# Patient Record
Sex: MALE | Race: WHITE | Employment: FULL TIME | URBAN - METROPOLITAN AREA
[De-identification: names, ages, dates, MRNs, and addresses within clinical notes are randomized per-mention and may not be internally consistent; named-entity substitution may affect disease eponyms.]

---

## 2024-03-19 ENCOUNTER — OFFICE VISIT (OUTPATIENT)
Dept: PRIMARY CARE | Facility: CLINIC | Age: 64
End: 2024-03-19
Payer: COMMERCIAL

## 2024-03-19 VITALS
HEIGHT: 70 IN | TEMPERATURE: 98.2 F | SYSTOLIC BLOOD PRESSURE: 133 MMHG | HEART RATE: 57 BPM | DIASTOLIC BLOOD PRESSURE: 77 MMHG | BODY MASS INDEX: 25.62 KG/M2 | OXYGEN SATURATION: 97 % | WEIGHT: 179 LBS

## 2024-03-19 DIAGNOSIS — E78.2 HYPERLIPIDEMIA, MIXED: ICD-10-CM

## 2024-03-19 DIAGNOSIS — H57.9 EYE PRESSURE: ICD-10-CM

## 2024-03-19 DIAGNOSIS — Z23 NEED FOR SHINGLES VACCINE: ICD-10-CM

## 2024-03-19 DIAGNOSIS — Z12.5 SCREENING FOR PROSTATE CANCER: ICD-10-CM

## 2024-03-19 DIAGNOSIS — E55.9 VITAMIN D DEFICIENCY: ICD-10-CM

## 2024-03-19 DIAGNOSIS — Z00.00 PHYSICAL EXAM: Primary | ICD-10-CM

## 2024-03-19 DIAGNOSIS — Z23 NEED FOR TDAP VACCINATION: ICD-10-CM

## 2024-03-19 DIAGNOSIS — Z12.11 SCREENING FOR COLON CANCER: ICD-10-CM

## 2024-03-19 PROBLEM — M17.12 PATELLOFEMORAL ARTHRITIS OF LEFT KNEE: Status: ACTIVE | Noted: 2024-03-19

## 2024-03-19 PROBLEM — J06.9 ACUTE UPPER RESPIRATORY INFECTION: Status: ACTIVE | Noted: 2024-03-19

## 2024-03-19 PROBLEM — M23.304 DERANGEMENT OF MEDIAL MENISCUS OF LEFT KNEE: Status: ACTIVE | Noted: 2024-03-19

## 2024-03-19 PROBLEM — S83.232A COMPLEX TEAR OF MEDIAL MENISCUS OF LEFT KNEE AS CURRENT INJURY: Status: ACTIVE | Noted: 2024-03-19

## 2024-03-19 PROBLEM — M25.562 LEFT KNEE PAIN: Status: ACTIVE | Noted: 2024-03-19

## 2024-03-19 PROCEDURE — 90472 IMMUNIZATION ADMIN EACH ADD: CPT | Performed by: INTERNAL MEDICINE

## 2024-03-19 PROCEDURE — 99396 PREV VISIT EST AGE 40-64: CPT | Performed by: INTERNAL MEDICINE

## 2024-03-19 PROCEDURE — 90715 TDAP VACCINE 7 YRS/> IM: CPT | Performed by: INTERNAL MEDICINE

## 2024-03-19 PROCEDURE — 90750 HZV VACC RECOMBINANT IM: CPT | Performed by: INTERNAL MEDICINE

## 2024-03-19 PROCEDURE — 90471 IMMUNIZATION ADMIN: CPT | Performed by: INTERNAL MEDICINE

## 2024-03-19 PROCEDURE — 1036F TOBACCO NON-USER: CPT | Performed by: INTERNAL MEDICINE

## 2024-03-19 NOTE — PROGRESS NOTES
"SUBJECTIVE:   Blas Spangler is a 63 y.o. male presenting for his annual physical/wellness.  PCP: Josh Mancilla MD  New pt, here to establish care and physical.    Healthy in general.  No major past medical or surgical hx  Recently during routine eye exam, doctor noticed somewhat protrusion with the right eye. They wanted him to get thyroid function checked  No smoke  Drinks socially  .  Works in [employer])  Complains of feeling some sob after he eats too much especially when lying down. No GERD symptoms. No other GI or chest symptoms  Had Xray's chest done in 2020.    He works out 3-4x per week.  when works out, no sob or cp    Colon screening: hx of Colonoscopy years ago. Had colon polyps.  Prostate screening: No results found for: \"PSA\"  Vaccines:  needs shingrix, tdap  Diet:   healthy, not vegetarian.  Exercises:   regularly.  Lab Results   Component Value Date    HGBA1C 5.6 01/14/2021     No results found for: \"CREATININE\"  No results found for: \"CHOL\"  No results found for: \"HDL\"  No results found for: \"LDLCALC\"  No results found for: \"TRIG\"  No components found for: \"CHOLHDL\"    ROS:  Feeling well. No dyspnea or chest pain on exertion. No abdominal pain, change in bowel habits, black or bloody stools. No urinary tract or prostatic symptoms. No neurological complaints.  Snores only some time. Feels rested if had good night sleep.       OBJECTIVE:   The patient appears well, alert, oriented x 3, in no distress.   /77   Pulse 57   Temp 36.8 °C (98.2 °F)   Ht 1.784 m (5' 10.25\")   Wt 81.2 kg (179 lb)   SpO2 97%   BMI 25.50 kg/m²   ENT normal.  Neck supple. No adenopathy or thyromegaly. ROBIN. Lungs are clear, good air entry, no wheezes, rhonchi or rales. S1 and S2 normal, no murmurs, regular rate and rhythm. Abdomen is soft without tenderness, guarding, mass or organomegaly. Extremities show no edema, normal peripheral pulses. Neurological is normal " without focal findings.      1. Physical exam  CBC, Comprehensive Metabolic Panel, Hemoglobin A1C, Lipid Panel      2. Need for shingles vaccine  Zoster vaccine, recombinant, adult (SHINGRIX)      3. Hyperlipidemia, mixed        4. Vitamin D deficiency        5. Eye pressure  TSH with reflex to Free T4 if abnormal      6. Screening for prostate cancer  Prostate Specific Antigen, Screen      7. Screening for colon cancer  Colonoscopy Screening; Average Risk Patient      8. Need for Tdap vaccination  Tdap vaccine, age 7 years and older  (BOOSTRIX)

## 2024-03-22 DIAGNOSIS — Z12.11 COLON CANCER SCREENING: ICD-10-CM

## 2024-03-22 RX ORDER — POLYETHYLENE GLYCOL 3350, SODIUM SULFATE ANHYDROUS, SODIUM BICARBONATE, SODIUM CHLORIDE, POTASSIUM CHLORIDE 236; 22.74; 6.74; 5.86; 2.97 G/4L; G/4L; G/4L; G/4L; G/4L
4000 POWDER, FOR SOLUTION ORAL ONCE
Qty: 4000 ML | Refills: 0 | Status: SHIPPED | OUTPATIENT
Start: 2024-03-22 | End: 2024-03-22

## 2024-03-23 ENCOUNTER — LAB (OUTPATIENT)
Dept: LAB | Facility: LAB | Age: 64
End: 2024-03-23
Payer: COMMERCIAL

## 2024-03-23 DIAGNOSIS — Z00.00 PHYSICAL EXAM: ICD-10-CM

## 2024-03-23 DIAGNOSIS — Z12.5 SCREENING FOR PROSTATE CANCER: ICD-10-CM

## 2024-03-23 DIAGNOSIS — H57.9 EYE PRESSURE: ICD-10-CM

## 2024-03-23 PROCEDURE — 85027 COMPLETE CBC AUTOMATED: CPT

## 2024-03-23 PROCEDURE — 84439 ASSAY OF FREE THYROXINE: CPT

## 2024-03-23 PROCEDURE — 83036 HEMOGLOBIN GLYCOSYLATED A1C: CPT

## 2024-03-23 PROCEDURE — 80053 COMPREHEN METABOLIC PANEL: CPT

## 2024-03-23 PROCEDURE — 84443 ASSAY THYROID STIM HORMONE: CPT

## 2024-03-23 PROCEDURE — 80061 LIPID PANEL: CPT

## 2024-03-23 PROCEDURE — 84153 ASSAY OF PSA TOTAL: CPT

## 2024-03-23 PROCEDURE — 36415 COLL VENOUS BLD VENIPUNCTURE: CPT

## 2024-03-24 DIAGNOSIS — R79.89 ELEVATED TSH: Primary | ICD-10-CM

## 2024-03-24 LAB
ALBUMIN SERPL BCP-MCNC: 4 G/DL (ref 3.4–5)
ALP SERPL-CCNC: 69 U/L (ref 33–136)
ALT SERPL W P-5'-P-CCNC: 15 U/L (ref 10–52)
ANION GAP SERPL CALC-SCNC: 13 MMOL/L (ref 10–20)
AST SERPL W P-5'-P-CCNC: 16 U/L (ref 9–39)
BILIRUB SERPL-MCNC: 0.8 MG/DL (ref 0–1.2)
BUN SERPL-MCNC: 16 MG/DL (ref 6–23)
CALCIUM SERPL-MCNC: 9.4 MG/DL (ref 8.6–10.6)
CHLORIDE SERPL-SCNC: 104 MMOL/L (ref 98–107)
CHOLEST SERPL-MCNC: 200 MG/DL (ref 0–199)
CHOLESTEROL/HDL RATIO: 5.5
CO2 SERPL-SCNC: 27 MMOL/L (ref 21–32)
CREAT SERPL-MCNC: 1.2 MG/DL (ref 0.5–1.3)
EGFRCR SERPLBLD CKD-EPI 2021: 68 ML/MIN/1.73M*2
ERYTHROCYTE [DISTWIDTH] IN BLOOD BY AUTOMATED COUNT: 12.6 % (ref 11.5–14.5)
EST. AVERAGE GLUCOSE BLD GHB EST-MCNC: 108 MG/DL
GLUCOSE SERPL-MCNC: 88 MG/DL (ref 74–99)
HBA1C MFR BLD: 5.4 %
HCT VFR BLD AUTO: 43.8 % (ref 41–52)
HDLC SERPL-MCNC: 36.4 MG/DL
HGB BLD-MCNC: 14.3 G/DL (ref 13.5–17.5)
LDLC SERPL CALC-MCNC: 137 MG/DL
MCH RBC QN AUTO: 31.7 PG (ref 26–34)
MCHC RBC AUTO-ENTMCNC: 32.6 G/DL (ref 32–36)
MCV RBC AUTO: 97 FL (ref 80–100)
NON HDL CHOLESTEROL: 164 MG/DL (ref 0–149)
NRBC BLD-RTO: 0 /100 WBCS (ref 0–0)
PLATELET # BLD AUTO: 273 X10*3/UL (ref 150–450)
POTASSIUM SERPL-SCNC: 4.7 MMOL/L (ref 3.5–5.3)
PROT SERPL-MCNC: 6.6 G/DL (ref 6.4–8.2)
PSA SERPL-MCNC: 0.8 NG/ML
RBC # BLD AUTO: 4.51 X10*6/UL (ref 4.5–5.9)
SODIUM SERPL-SCNC: 139 MMOL/L (ref 136–145)
T4 FREE SERPL-MCNC: 0.95 NG/DL (ref 0.78–1.48)
TRIGL SERPL-MCNC: 131 MG/DL (ref 0–149)
TSH SERPL-ACNC: 4.14 MIU/L (ref 0.44–3.98)
VLDL: 26 MG/DL (ref 0–40)
WBC # BLD AUTO: 5.5 X10*3/UL (ref 4.4–11.3)

## 2024-05-29 DIAGNOSIS — Z12.11 SPECIAL SCREENING FOR MALIGNANT NEOPLASMS, COLON: ICD-10-CM

## 2024-05-29 RX ORDER — POLYETHYLENE GLYCOL 3350, SODIUM SULFATE ANHYDROUS, SODIUM BICARBONATE, SODIUM CHLORIDE, POTASSIUM CHLORIDE 236; 22.74; 6.74; 5.86; 2.97 G/4L; G/4L; G/4L; G/4L; G/4L
POWDER, FOR SOLUTION ORAL
Qty: 4000 ML | Refills: 0 | Status: SHIPPED | OUTPATIENT
Start: 2024-05-29

## 2024-05-30 ENCOUNTER — APPOINTMENT (OUTPATIENT)
Dept: PRIMARY CARE | Facility: CLINIC | Age: 64
End: 2024-05-30
Payer: COMMERCIAL

## 2024-05-31 ENCOUNTER — OFFICE VISIT (OUTPATIENT)
Dept: GASTROENTEROLOGY | Facility: EXTERNAL LOCATION | Age: 64
End: 2024-05-31
Payer: COMMERCIAL

## 2024-05-31 DIAGNOSIS — D12.3 BENIGN NEOPLASM OF TRANSVERSE COLON: ICD-10-CM

## 2024-05-31 DIAGNOSIS — D12.2 BENIGN NEOPLASM OF ASCENDING COLON: Primary | ICD-10-CM

## 2024-05-31 DIAGNOSIS — K64.8 INTERNAL HEMORRHOIDS: ICD-10-CM

## 2024-05-31 DIAGNOSIS — Z12.11 SCREENING FOR COLON CANCER: ICD-10-CM

## 2024-05-31 PROCEDURE — 45385 COLONOSCOPY W/LESION REMOVAL: CPT | Performed by: INTERNAL MEDICINE

## 2024-06-03 ENCOUNTER — LAB REQUISITION (OUTPATIENT)
Dept: LAB | Facility: HOSPITAL | Age: 64
End: 2024-06-03
Payer: COMMERCIAL

## 2024-06-11 ENCOUNTER — TELEPHONE (OUTPATIENT)
Dept: PRIMARY CARE | Facility: CLINIC | Age: 64
End: 2024-06-11

## 2024-06-11 ENCOUNTER — OFFICE VISIT (OUTPATIENT)
Dept: PRIMARY CARE | Facility: CLINIC | Age: 64
End: 2024-06-11
Payer: COMMERCIAL

## 2024-06-11 DIAGNOSIS — Z23 NEED FOR SHINGLES VACCINE: Primary | ICD-10-CM

## 2024-06-11 PROBLEM — H44.9 DISORDER OF GLOBE: Status: ACTIVE | Noted: 2024-06-11

## 2024-06-11 PROBLEM — E55.9 VITAMIN D DEFICIENCY: Status: ACTIVE | Noted: 2024-06-11

## 2024-06-11 PROBLEM — M23.307 DERANGEMENT OF MENISCUS OF LEFT KNEE: Status: ACTIVE | Noted: 2024-03-19

## 2024-06-11 PROBLEM — M65.9 SYNOVITIS OF LEFT FOOT: Status: ACTIVE | Noted: 2022-03-31

## 2024-06-11 PROBLEM — Q66.70 CAVUS DEFORMITY OF FOOT: Status: ACTIVE | Noted: 2022-03-31

## 2024-06-11 PROBLEM — M65.972 SYNOVITIS OF LEFT FOOT: Status: ACTIVE | Noted: 2022-03-31

## 2024-06-11 PROBLEM — M77.50 TENDINITIS OF FOOT: Status: ACTIVE | Noted: 2022-03-31

## 2024-06-11 PROCEDURE — 90471 IMMUNIZATION ADMIN: CPT | Performed by: INTERNAL MEDICINE

## 2024-06-11 PROCEDURE — 90750 HZV VACC RECOMBINANT IM: CPT | Performed by: INTERNAL MEDICINE

## 2024-06-11 NOTE — TELEPHONE ENCOUNTER
Let pt know GI doctor removed a polys, and the pathology is pending.  Colonoscopy otherwise was good

## 2024-06-11 NOTE — PROGRESS NOTES
Patient came in today for #2 SHINGRIX VACCINE_. Injection was given in the_RIGHT deltoid. Injection tolerated well.

## 2024-06-14 LAB
LABORATORY COMMENT REPORT: NORMAL
PATH REPORT.FINAL DX SPEC: NORMAL
PATH REPORT.GROSS SPEC: NORMAL
PATH REPORT.RELEVANT HX SPEC: NORMAL
PATH REPORT.TOTAL CANCER: NORMAL

## 2024-06-16 NOTE — RESULT ENCOUNTER NOTE
The polyps that were removed from your colon were adenomas (benign but precancerous).  The recommendation is to repeat colonoscopy in 3 years.      Krysta Hare MD

## 2025-01-06 ENCOUNTER — APPOINTMENT (OUTPATIENT)
Dept: PRIMARY CARE | Facility: CLINIC | Age: 65
End: 2025-01-06
Payer: COMMERCIAL

## 2025-01-27 ENCOUNTER — APPOINTMENT (OUTPATIENT)
Dept: PRIMARY CARE | Facility: CLINIC | Age: 65
End: 2025-01-27
Payer: COMMERCIAL

## 2025-01-27 VITALS
TEMPERATURE: 98.3 F | WEIGHT: 178 LBS | OXYGEN SATURATION: 96 % | HEART RATE: 54 BPM | DIASTOLIC BLOOD PRESSURE: 71 MMHG | HEIGHT: 70 IN | BODY MASS INDEX: 25.48 KG/M2 | SYSTOLIC BLOOD PRESSURE: 127 MMHG

## 2025-01-27 DIAGNOSIS — Z00.00 ENCOUNTER FOR HEALTH MAINTENANCE EXAMINATION IN ADULT: ICD-10-CM

## 2025-01-27 DIAGNOSIS — Z12.5 SCREENING FOR PROSTATE CANCER: ICD-10-CM

## 2025-01-27 DIAGNOSIS — R79.89 ABNORMAL TSH: ICD-10-CM

## 2025-01-27 DIAGNOSIS — H05.20 PROTRUSION OF EYEBALLS: Primary | ICD-10-CM

## 2025-01-27 PROCEDURE — 99213 OFFICE O/P EST LOW 20 MIN: CPT | Performed by: INTERNAL MEDICINE

## 2025-01-27 PROCEDURE — 3008F BODY MASS INDEX DOCD: CPT | Performed by: INTERNAL MEDICINE

## 2025-01-27 PROCEDURE — 1036F TOBACCO NON-USER: CPT | Performed by: INTERNAL MEDICINE

## 2025-01-27 RX ORDER — KETOCONAZOLE 20 MG/G
CREAM TOPICAL
COMMUNITY
Start: 2025-01-09

## 2025-01-27 NOTE — PROGRESS NOTES
"PCP: Josh Mancilla MD   I have reviewed existing histories, notes, past medical history, surgical history, social history, family history, med list, allergy list, and immunization, and updated.    HPI:   Pt still notices right eye protrusion since last year. He saw optometrist one year ago, who told him about this. He has not gone back to see them yet.  Last year we did check thyroid. Tsh was slightly high, with normal thyroxine      Lab Results   Component Value Date    WBC 5.5 03/23/2024    HGB 14.3 03/23/2024    HCT 43.8 03/23/2024     03/23/2024    CHOL 200 (H) 03/23/2024    TRIG 131 03/23/2024    HDL 36.4 03/23/2024    ALT 15 03/23/2024    AST 16 03/23/2024     03/23/2024    K 4.7 03/23/2024     03/23/2024    CREATININE 1.20 03/23/2024    BUN 16 03/23/2024    CO2 27 03/23/2024    TSH 4.14 (H) 03/23/2024    HGBA1C 5.4 03/23/2024     Physical exam:  /71   Pulse 54   Temp 36.8 °C (98.3 °F)   Ht 1.772 m (5' 9.75\")   Wt 80.7 kg (178 lb)   SpO2 96%   BMI 25.72 kg/m²    right eye grossly okay  No significant protrusion grossly, it is possible that there is subtle change.  Neck exam showed no mass, lymphadenopathy, or thyroid enlargement, and no carotid bruit.  Heart exam showed normal heart sounds, no murmur, clicks, gallops or rubs. Regular rate and rhythm. Chest is clear; no wheezes or rales.  No focal neurologic deficit      Assessments/orders:   Assessment & Plan  Protrusion of eyeballs    Orders:    Referral to Ophthalmology; Future    Abnormal TSH    Orders:    TSH with reflex to Free T4 if abnormal; Future    Screening for prostate cancer    Orders:    Prostate Specific Antigen, Screen; Future    Encounter for health maintenance examination in adult    Orders:    Lipid Panel; Future    Hemoglobin A1C; Future    Prostate Specific Antigen, Screen; Future    Comprehensive Metabolic Panel; Future    CBC; Future       Pt will get labs done soon.  Has appointment for physical in two " months

## 2025-02-11 ENCOUNTER — APPOINTMENT (OUTPATIENT)
Dept: OPHTHALMOLOGY | Age: 65
End: 2025-02-11
Payer: COMMERCIAL

## 2025-02-11 DIAGNOSIS — H05.20 BULGING EYES: Primary | ICD-10-CM

## 2025-02-11 PROCEDURE — 99203 OFFICE O/P NEW LOW 30 MIN: CPT | Performed by: OPHTHALMOLOGY

## 2025-02-11 ASSESSMENT — REFRACTION_MANIFEST
OS_AXIS: 007
OD_ADD: +2.50
OD_SPHERE: +0.25
OS_SPHERE: PLANO
OS_CYLINDER: -0.50
OD_CYLINDER: -0.50
OS_ADD: +2.50
OS_CYLINDER: -0.25
OD_AXIS: 155
OD_CYLINDER: -0.75
OD_SPHERE: +0.25
OD_AXIS: 156
METHOD_AUTOREFRACTION: 1
OS_AXIS: 005
OS_SPHERE: -0.25

## 2025-02-11 ASSESSMENT — ENCOUNTER SYMPTOMS
GASTROINTESTINAL NEGATIVE: 0
ENDOCRINE NEGATIVE: 0
NEUROLOGICAL NEGATIVE: 0
MUSCULOSKELETAL NEGATIVE: 0
EYES NEGATIVE: 1
ALLERGIC/IMMUNOLOGIC NEGATIVE: 0
HEMATOLOGIC/LYMPHATIC NEGATIVE: 0
PSYCHIATRIC NEGATIVE: 0
CONSTITUTIONAL NEGATIVE: 0
RESPIRATORY NEGATIVE: 0
CARDIOVASCULAR NEGATIVE: 0

## 2025-02-11 ASSESSMENT — VISUAL ACUITY
METHOD: SNELLEN - LINEAR
OS_SC: 20/20
OD_SC: 20/20

## 2025-02-11 ASSESSMENT — CONF VISUAL FIELD
OD_SUPERIOR_NASAL_RESTRICTION: 0
OD_NORMAL: 1
OS_NORMAL: 1
OD_INFERIOR_NASAL_RESTRICTION: 0
OS_INFERIOR_TEMPORAL_RESTRICTION: 0
OS_INFERIOR_NASAL_RESTRICTION: 0
OD_INFERIOR_TEMPORAL_RESTRICTION: 0
OS_SUPERIOR_NASAL_RESTRICTION: 0
OD_SUPERIOR_TEMPORAL_RESTRICTION: 0
OS_SUPERIOR_TEMPORAL_RESTRICTION: 0

## 2025-02-11 ASSESSMENT — TONOMETRY
OD_IOP_MMHG: 13
OS_IOP_MMHG: 13
IOP_METHOD: GOLDMANN APPLANATION

## 2025-02-11 ASSESSMENT — SLIT LAMP EXAM - LIDS: COMMENTS: NORMAL

## 2025-02-11 ASSESSMENT — EXTERNAL EXAM - LEFT EYE: OS_EXAM: NORMAL

## 2025-02-11 ASSESSMENT — EXTERNAL EXAM - RIGHT EYE: OD_EXAM: NORMAL

## 2025-02-12 NOTE — PROGRESS NOTES
"Assessment/Plan   Diagnoses and all orders for this visit:  Bulging eyes  Noticed mainly in pictures, and mentioned to him once by his optometrist. Doesn't looks significant on exam, normal EOM.     Might have some facial asymmetry with minimal left ptosis that makes the right eye appear \"bulging\", I don't think its significant on exam though.    TSH done In 3/2024 was 4.14, normal Thyroxine. Level 0.95    Will get  -     Thyroid Peroxidase (TPO) Antibody; Future    Might consider MRI brain/orbit in the future  See Dr. Estrada in 4-6 months for second opinion.  "

## 2025-03-05 ENCOUNTER — TELEPHONE (OUTPATIENT)
Dept: PRIMARY CARE | Facility: CLINIC | Age: 65
End: 2025-03-05
Payer: COMMERCIAL

## 2025-03-05 NOTE — TELEPHONE ENCOUNTER
Pt st that his insurance will not pay for his A1c. He st that it needs to be coded differently and not under preventative for insurance to cover.

## 2025-03-26 ENCOUNTER — APPOINTMENT (OUTPATIENT)
Dept: PRIMARY CARE | Facility: CLINIC | Age: 65
End: 2025-03-26
Payer: COMMERCIAL

## 2025-03-27 ENCOUNTER — APPOINTMENT (OUTPATIENT)
Dept: PRIMARY CARE | Facility: CLINIC | Age: 65
End: 2025-03-27
Payer: COMMERCIAL

## 2025-06-14 LAB — TSH SERPL-ACNC: 2.87 MIU/L (ref 0.4–4.5)

## 2025-06-16 LAB — THYROPEROXIDASE AB SERPL-ACNC: <1 IU/ML

## 2025-06-23 ENCOUNTER — APPOINTMENT (OUTPATIENT)
Dept: PRIMARY CARE | Facility: CLINIC | Age: 65
End: 2025-06-23
Payer: COMMERCIAL

## 2025-06-23 VITALS
BODY MASS INDEX: 25.2 KG/M2 | WEIGHT: 176 LBS | OXYGEN SATURATION: 95 % | HEIGHT: 70 IN | DIASTOLIC BLOOD PRESSURE: 75 MMHG | HEART RATE: 58 BPM | SYSTOLIC BLOOD PRESSURE: 133 MMHG | TEMPERATURE: 98.5 F

## 2025-06-23 DIAGNOSIS — Z12.5 SCREENING FOR PROSTATE CANCER: ICD-10-CM

## 2025-06-23 DIAGNOSIS — E55.9 VITAMIN D DEFICIENCY: ICD-10-CM

## 2025-06-23 DIAGNOSIS — Z00.00 PHYSICAL EXAM: Primary | ICD-10-CM

## 2025-06-23 PROCEDURE — 99396 PREV VISIT EST AGE 40-64: CPT | Performed by: INTERNAL MEDICINE

## 2025-06-23 PROCEDURE — 3008F BODY MASS INDEX DOCD: CPT | Performed by: INTERNAL MEDICINE

## 2025-06-23 PROCEDURE — 90677 PCV20 VACCINE IM: CPT | Performed by: INTERNAL MEDICINE

## 2025-06-23 PROCEDURE — 90471 IMMUNIZATION ADMIN: CPT | Performed by: INTERNAL MEDICINE

## 2025-06-23 ASSESSMENT — ANXIETY QUESTIONNAIRES
GAD7 TOTAL SCORE: 2
4. TROUBLE RELAXING: SEVERAL DAYS
IF YOU CHECKED OFF ANY PROBLEMS ON THIS QUESTIONNAIRE, HOW DIFFICULT HAVE THESE PROBLEMS MADE IT FOR YOU TO DO YOUR WORK, TAKE CARE OF THINGS AT HOME, OR GET ALONG WITH OTHER PEOPLE: NOT DIFFICULT AT ALL
6. BECOMING EASILY ANNOYED OR IRRITABLE: NOT AT ALL
2. NOT BEING ABLE TO STOP OR CONTROL WORRYING: NOT AT ALL
7. FEELING AFRAID AS IF SOMETHING AWFUL MIGHT HAPPEN: NOT AT ALL
1. FEELING NERVOUS, ANXIOUS, OR ON EDGE: NOT AT ALL
3. WORRYING TOO MUCH ABOUT DIFFERENT THINGS: NOT AT ALL
5. BEING SO RESTLESS THAT IT IS HARD TO SIT STILL: SEVERAL DAYS

## 2025-06-23 ASSESSMENT — PATIENT HEALTH QUESTIONNAIRE - PHQ9
7. TROUBLE CONCENTRATING ON THINGS, SUCH AS READING THE NEWSPAPER OR WATCHING TELEVISION: NOT AT ALL
1. LITTLE INTEREST OR PLEASURE IN DOING THINGS: NOT AT ALL
SUM OF ALL RESPONSES TO PHQ QUESTIONS 1-9: 1
6. FEELING BAD ABOUT YOURSELF - OR THAT YOU ARE A FAILURE OR HAVE LET YOURSELF OR YOUR FAMILY DOWN: NOT AT ALL
2. FEELING DOWN, DEPRESSED OR HOPELESS: NOT AT ALL
3. TROUBLE FALLING OR STAYING ASLEEP OR SLEEPING TOO MUCH: SEVERAL DAYS
8. MOVING OR SPEAKING SO SLOWLY THAT OTHER PEOPLE COULD HAVE NOTICED. OR THE OPPOSITE, BEING SO FIGETY OR RESTLESS THAT YOU HAVE BEEN MOVING AROUND A LOT MORE THAN USUAL: NOT AT ALL
4. FEELING TIRED OR HAVING LITTLE ENERGY: NOT AT ALL
SUM OF ALL RESPONSES TO PHQ9 QUESTIONS 1 AND 2: 0
5. POOR APPETITE OR OVEREATING: NOT AT ALL
9. THOUGHTS THAT YOU WOULD BE BETTER OFF DEAD, OR OF HURTING YOURSELF: NOT AT ALL

## 2025-06-23 NOTE — PROGRESS NOTES
"PCP: Josh Mancilla MD    SUBJECTIVE:   Blas Spangler is a 64 y.o. male presenting for his annual physical/wellness.  Feels well, no concerns.  Had a tic bite on his foot, had some local irritation but no bull's eye rash.    Colon screening: Up to date, had Colonoscopy 2024, showed large polyps, to repeat at 3 years after.  Prostate screening: Up to date   Vaccines: due for prevnar 20  Diet:  healthy.  Exercises:   regularly.  Lab Results   Component Value Date    HGBA1C 5.4 03/23/2024     Lab Results   Component Value Date    CREATININE 1.20 03/23/2024     Lab Results   Component Value Date    CHOL 200 (H) 03/23/2024     Lab Results   Component Value Date    HDL 36.4 03/23/2024     Lab Results   Component Value Date    LDLCALC 137 (H) 03/23/2024     Lab Results   Component Value Date    TRIG 131 03/23/2024         ROS:  Feeling well. No dyspnea or chest pain on exertion. No abdominal pain, change in bowel habits, black or bloody stools. No urinary tract or prostatic symptoms. No neurological complaints.      OBJECTIVE:   The patient appears well, alert, oriented x 3, in no distress.   /75   Pulse 58   Temp 36.9 °C (98.5 °F)   Ht 1.784 m (5' 10.25\")   Wt 79.8 kg (176 lb)   SpO2 95%   BMI 25.07 kg/m²   ENT normal.  Neck supple. No adenopathy or thyromegaly. ROBIN. Lungs are clear, good air entry, no wheezes, rhonchi or rales. S1 and S2 normal, no murmurs, regular rate and rhythm. Abdomen is soft without tenderness, guarding, mass or organomegaly. Extremities show no edema, normal peripheral pulses. Neurological is normal without focal findings.      Assessment & Plan  Physical exam    Orders:    CBC; Future    Comprehensive Metabolic Panel; Future    Hemoglobin A1C; Future    Lipid Panel; Future    Vitamin D deficiency  Hx of low vit D  Orders:    Vitamin D 25-Hydroxy,Total (for eval of Vitamin D levels); Future    Screening for prostate cancer    Orders:    Prostate Specific Antigen, Screen; Future     "

## 2025-07-04 LAB
25(OH)D3+25(OH)D2 SERPL-MCNC: 34 NG/ML (ref 30–100)
ALBUMIN SERPL-MCNC: 4.3 G/DL (ref 3.6–5.1)
ALP SERPL-CCNC: 64 U/L (ref 35–144)
ALT SERPL-CCNC: 13 U/L (ref 9–46)
ANION GAP SERPL CALCULATED.4IONS-SCNC: 6 MMOL/L (CALC) (ref 7–17)
AST SERPL-CCNC: 16 U/L (ref 10–35)
BILIRUB SERPL-MCNC: 0.8 MG/DL (ref 0.2–1.2)
BUN SERPL-MCNC: 18 MG/DL (ref 7–25)
CALCIUM SERPL-MCNC: 9.3 MG/DL (ref 8.6–10.3)
CHLORIDE SERPL-SCNC: 104 MMOL/L (ref 98–110)
CHOLEST SERPL-MCNC: 218 MG/DL
CHOLEST/HDLC SERPL: 5.3 (CALC)
CO2 SERPL-SCNC: 29 MMOL/L (ref 20–32)
CREAT SERPL-MCNC: 1.28 MG/DL (ref 0.7–1.35)
EGFRCR SERPLBLD CKD-EPI 2021: 63 ML/MIN/1.73M2
ERYTHROCYTE [DISTWIDTH] IN BLOOD BY AUTOMATED COUNT: 12.8 % (ref 11–15)
EST. AVERAGE GLUCOSE BLD GHB EST-MCNC: 117 MG/DL
EST. AVERAGE GLUCOSE BLD GHB EST-SCNC: 6.5 MMOL/L
GLUCOSE SERPL-MCNC: 92 MG/DL (ref 65–99)
HBA1C MFR BLD: 5.7 %
HCT VFR BLD AUTO: 43.7 % (ref 38.5–50)
HDLC SERPL-MCNC: 41 MG/DL
HGB BLD-MCNC: 14.3 G/DL (ref 13.2–17.1)
LDLC SERPL CALC-MCNC: 150 MG/DL (CALC)
MCH RBC QN AUTO: 32.1 PG (ref 27–33)
MCHC RBC AUTO-ENTMCNC: 32.7 G/DL (ref 32–36)
MCV RBC AUTO: 98 FL (ref 80–100)
NONHDLC SERPL-MCNC: 177 MG/DL (CALC)
PLATELET # BLD AUTO: 270 THOUSAND/UL (ref 140–400)
PMV BLD REES-ECKER: 10.6 FL (ref 7.5–12.5)
POTASSIUM SERPL-SCNC: 5 MMOL/L (ref 3.5–5.3)
PROT SERPL-MCNC: 6.8 G/DL (ref 6.1–8.1)
PSA SERPL-MCNC: 1.03 NG/ML
RBC # BLD AUTO: 4.46 MILLION/UL (ref 4.2–5.8)
SODIUM SERPL-SCNC: 139 MMOL/L (ref 135–146)
TRIGL SERPL-MCNC: 138 MG/DL
WBC # BLD AUTO: 6.2 THOUSAND/UL (ref 3.8–10.8)

## 2025-07-07 ENCOUNTER — APPOINTMENT (OUTPATIENT)
Dept: OPHTHALMOLOGY | Age: 65
End: 2025-07-07
Payer: COMMERCIAL

## 2025-07-22 ENCOUNTER — APPOINTMENT (OUTPATIENT)
Dept: OPHTHALMOLOGY | Age: 65
End: 2025-07-22
Payer: COMMERCIAL